# Patient Record
Sex: MALE | Race: WHITE | NOT HISPANIC OR LATINO | URBAN - METROPOLITAN AREA
[De-identification: names, ages, dates, MRNs, and addresses within clinical notes are randomized per-mention and may not be internally consistent; named-entity substitution may affect disease eponyms.]

---

## 2020-03-13 ENCOUNTER — EMERGENCY (EMERGENCY)
Facility: HOSPITAL | Age: 65
LOS: 1 days | End: 2020-03-13

## 2020-03-13 ENCOUNTER — EMERGENCY (EMERGENCY)
Facility: HOSPITAL | Age: 65
LOS: 1 days | Discharge: HOME | End: 2020-03-13
Attending: INTERNAL MEDICINE | Admitting: INTERNAL MEDICINE
Payer: MEDICARE

## 2020-03-13 VITALS
HEIGHT: 69 IN | OXYGEN SATURATION: 99 % | DIASTOLIC BLOOD PRESSURE: 81 MMHG | WEIGHT: 177.91 LBS | HEART RATE: 89 BPM | SYSTOLIC BLOOD PRESSURE: 158 MMHG | TEMPERATURE: 98 F | RESPIRATION RATE: 16 BRPM

## 2020-03-13 DIAGNOSIS — Z90.5 ACQUIRED ABSENCE OF KIDNEY: Chronic | ICD-10-CM

## 2020-03-13 LAB
POTASSIUM SERPL-MCNC: 4.5 MMOL/L — SIGNIFICANT CHANGE UP (ref 3.5–5.3)
POTASSIUM SERPL-SCNC: 4.5 MMOL/L — SIGNIFICANT CHANGE UP (ref 3.5–5.3)

## 2020-03-13 PROCEDURE — 36415 COLL VENOUS BLD VENIPUNCTURE: CPT

## 2020-03-13 PROCEDURE — 84132 ASSAY OF SERUM POTASSIUM: CPT

## 2020-03-13 PROCEDURE — 99283 EMERGENCY DEPT VISIT LOW MDM: CPT

## 2020-03-13 RX ORDER — FLUTICASONE PROPIONATE 220 MCG
0 AEROSOL WITH ADAPTER (GRAM) INHALATION
Qty: 0 | Refills: 0 | DISCHARGE

## 2020-03-13 RX ORDER — DOXAZOSIN MESYLATE 4 MG
0 TABLET ORAL
Qty: 0 | Refills: 0 | DISCHARGE

## 2020-03-13 RX ORDER — CHLORPHENIRAMINE MALEATE 4 MG
0 TABLET ORAL
Qty: 0 | Refills: 0 | DISCHARGE

## 2020-03-13 RX ORDER — ATORVASTATIN CALCIUM 80 MG/1
0 TABLET, FILM COATED ORAL
Qty: 0 | Refills: 0 | DISCHARGE

## 2020-03-13 RX ORDER — VALACYCLOVIR 500 MG/1
0 TABLET, FILM COATED ORAL
Qty: 0 | Refills: 0 | DISCHARGE

## 2020-03-13 RX ORDER — FAMOTIDINE 10 MG/ML
0 INJECTION INTRAVENOUS
Qty: 0 | Refills: 0 | DISCHARGE

## 2020-03-13 RX ORDER — ACETAMINOPHEN 500 MG
0 TABLET ORAL
Qty: 0 | Refills: 0 | DISCHARGE

## 2020-03-13 RX ORDER — LOSARTAN POTASSIUM 100 MG/1
0 TABLET, FILM COATED ORAL
Qty: 0 | Refills: 0 | DISCHARGE

## 2020-03-13 RX ORDER — MECLIZINE HCL 12.5 MG
0 TABLET ORAL
Qty: 0 | Refills: 0 | DISCHARGE

## 2020-03-13 RX ORDER — SIMETHICONE 80 MG/1
0 TABLET, CHEWABLE ORAL
Qty: 0 | Refills: 0 | DISCHARGE

## 2020-03-13 RX ORDER — ASPIRIN/CALCIUM CARB/MAGNESIUM 324 MG
0 TABLET ORAL
Qty: 0 | Refills: 0 | DISCHARGE

## 2020-03-13 RX ORDER — CALCIUM ACETATE 667 MG
0 TABLET ORAL
Qty: 0 | Refills: 0 | DISCHARGE

## 2020-03-13 RX ORDER — LACTOBACILLUS ACIDOPHILUS 100MM CELL
0 CAPSULE ORAL
Qty: 0 | Refills: 0 | DISCHARGE

## 2020-03-13 RX ORDER — METOPROLOL TARTRATE 50 MG
0 TABLET ORAL
Qty: 0 | Refills: 0 | DISCHARGE

## 2020-03-13 RX ORDER — AMLODIPINE BESYLATE 2.5 MG/1
0 TABLET ORAL
Qty: 0 | Refills: 0 | DISCHARGE

## 2020-03-13 RX ORDER — SUCROFERRIC OXYHYDROXIDE 500 MG/1
0 TABLET, CHEWABLE ORAL
Qty: 0 | Refills: 0 | DISCHARGE

## 2020-03-13 NOTE — ED PROVIDER NOTE - OBJECTIVE STATEMENT
66 y/o WM, the patient was instructed to have his potassium level repeated due to an abnormality. He reports that he is having surgery this afternoon and the original testing of his blood was part of the presurgical testing. He is note having any symptoms. 66 y/o WM, h/o ESRD, dialysis. His last dialysis was yesterday. He was instructed to have his potassium level repeated due to hyperkalemia on predialysis blood testing . He reports that he is having surgery this afternoon.  He is not experiencing  any symptoms. 64 y/o WM, h/o ESRD, dialysis. His last dialysis was yesterday. He was instructed to have his potassium level repeated due to hyperkalemia on predialysis blood testing . He reports that he is having surgery for carpal tunnel syndrome  this afternoon.  He is not experiencing  any symptoms.

## 2020-03-13 NOTE — ED PROVIDER NOTE - CARE PROVIDER_API CALL
Henrik Muir (DO)  Family Medicine  [address] [place] [address]  Phone: [phone]  Fax: [phone]  Follow Up Time: 1-3 Days

## 2020-03-13 NOTE — ED ADULT TRIAGE NOTE - CHIEF COMPLAINT QUOTE
Patient has a carpal tunnel surgery today and was told by his surgeon that he needs a potassium level checked prior to the procedure STAT and was referred to ED for faster result.

## 2020-03-13 NOTE — ED PROVIDER NOTE - PATIENT PORTAL LINK FT
You can access the FollowMyHealth Patient Portal offered by [facility] by registering at the following website: [email or web address]. By joining [facility]’s FollowMyHealth portal, you will also be able to view your health information using other applications (apps) compatible with our system.

## 2020-03-13 NOTE — ED ADULT NURSE NOTE - OBJECTIVE STATEMENT
Patient alert and oriented X 3. Sent in by [facility] for repeat potassium level. Patient had pre op labs drawn 2 days ago. Patient scheduled for carpal tunnel surgery to left arm. Denies chest pain, shortness of breath, nausea, vomiting, headache, dizziness and fever. Lungs clears bilaterally. Respirations even and not labored. Abdomen soft non tender. Patient completed dialysis yesterday. + bruit and thrill to left arm.

## 2020-03-13 NOTE — ED PROVIDER NOTE - NSFOLLOWUPINSTRUCTIONS_ED_ALL_ED_FT
EnglishSpanish    Hyperkalemia  Hyperkalemia is when you have too much potassium in your blood. Potassium helps your body in many ways, but having too much can cause problems. If there is too much potassium in your blood, it can affect how your heart works.  Potassium is normally removed from your body by your kidneys. Many things can cause the amount in your blood to be high. Medicines and other treatments can be used to bring the amount to a normal level. Treatment may need to be done in the hospital.  Follow these instructions at home:     Take over-the-counter and prescription medicines only as told by your doctor.Do not take any of the following unless your doctor says it is okay:  Supplements.Natural products.Herbs.Vitamins.Limit how much alcohol you drink as told by your doctor.Do not use drugs. If you need help quitting, ask your doctor.If you have kidney disease, you may need to follow a low-potassium diet. A  (dietitian) can help you.Keep all follow-up visits as told by your doctor. This is important.Contact a doctor if:  Your heartbeat is not regular or is very slow.You feel dizzy (light-headed).You feel weak.You feel sick to your stomach (nauseous).You have tingling in your hands or feet.You lose feeling (have numbness) in your hands or feet.Get help right away if:  You are short of breath.You have chest pain.You pass out (faint).You cannot move your muscles.Summary  Hyperkalemia is when you have too much potassium in your blood.Take over-the-counter and prescription medicines only as told by your doctor.Limit how much alcohol you drink as told by your doctor.Contact a doctor if your heartbeat is not regular.This information is not intended to replace advice given to you by your health care provider. Make sure you discuss any questions you have with your health care provider.

## 2020-03-13 NOTE — ED PROVIDER NOTE - PMH
Anemia    ESRD (end stage renal disease)    Multiple myeloma    JOHNNY (obstructive sleep apnea)    PNA (pneumonia)    SIRS (systemic inflammatory response syndrome)    TIA (transient ischemic attack) Anemia    ESRD (end stage renal disease)    Genital herpes    Multiple myeloma    JOHNNY (obstructive sleep apnea)    PNA (pneumonia)    SIRS (systemic inflammatory response syndrome)    TIA (transient ischemic attack)

## 2020-03-13 NOTE — ED ADULT NURSE NOTE - CHPI ED NUR SYMPTOMS NEG
no vomiting/no nausea/no pain/no chills/no weakness/no decreased eating/drinking/no dizziness/no fever/no tingling

## 2024-04-03 NOTE — ED ADULT NURSE NOTE - CADM POA URETHRAL CATHETER
Pt hypertensive s/p blood transfusion, on arrival of EMS for transfer back to [facility]. Per medical records pt missed doses of HTN medications today. MD Tapia aware and to order medications. Transfer to be rescheduled. pt denies HA, dizziness, blurry vision at this time. States "I feel alright." Pt hypertensive s/p blood transfusion, on arrival of EMS for transfer back to [facility]. Per EMS facility will not accept pt hypernsive/tachycardic. Per medical records pt missed doses of HTN medications today. MD Tapia aware and to order medications. Transfer to be rescheduled. pt denies HA, dizziness, blurry vision at this time. States "I feel alright." No